# Patient Record
Sex: FEMALE | Race: WHITE
[De-identification: names, ages, dates, MRNs, and addresses within clinical notes are randomized per-mention and may not be internally consistent; named-entity substitution may affect disease eponyms.]

---

## 2017-04-11 ENCOUNTER — HOSPITAL ENCOUNTER (OUTPATIENT)
Dept: HOSPITAL 53 - M LRY | Age: 82
End: 2017-04-11
Attending: PHYSICIAN ASSISTANT
Payer: MEDICARE

## 2017-04-11 ENCOUNTER — HOSPITAL ENCOUNTER (OUTPATIENT)
Dept: HOSPITAL 53 - M SFHCLERA | Age: 82
End: 2017-04-11
Attending: PHYSICIAN ASSISTANT
Payer: MEDICARE

## 2017-04-11 DIAGNOSIS — Z79.899: ICD-10-CM

## 2017-04-11 DIAGNOSIS — Z79.1: ICD-10-CM

## 2017-04-11 DIAGNOSIS — M17.12: Primary | ICD-10-CM

## 2017-04-11 DIAGNOSIS — R55: Primary | ICD-10-CM

## 2017-04-11 DIAGNOSIS — R55: ICD-10-CM

## 2017-04-11 DIAGNOSIS — M17.12: ICD-10-CM

## 2017-04-11 LAB
ALBUMIN SERPL BCG-MCNC: 3.6 GM/DL (ref 3.2–5.2)
ALBUMIN/GLOB SERPL: 1.06 {RATIO} (ref 1–1.93)
ALP SERPL-CCNC: 102 U/L (ref 45–117)
ALT SERPL W P-5'-P-CCNC: 16 U/L (ref 12–78)
ANION GAP SERPL CALC-SCNC: 4 MEQ/L (ref 8–16)
AST SERPL-CCNC: 16 U/L (ref 15–37)
BILIRUB SERPL-MCNC: 0.3 MG/DL (ref 0.2–1)
BUN SERPL-MCNC: 18 MG/DL (ref 7–18)
CALCIUM SERPL-MCNC: 8.7 MG/DL (ref 8.8–10.2)
CHLORIDE SERPL-SCNC: 105 MEQ/L (ref 98–107)
CO2 SERPL-SCNC: 30 MEQ/L (ref 21–32)
CREAT SERPL-MCNC: 0.72 MG/DL (ref 0.55–1.02)
ERYTHROCYTE [DISTWIDTH] IN BLOOD BY AUTOMATED COUNT: 12 % (ref 11.5–14.5)
GFR SERPL CREATININE-BSD FRML MDRD: > 60 ML/MIN/{1.73_M2} (ref 32–?)
GLUCOSE SERPL-MCNC: 91 MG/DL (ref 83–110)
MCH RBC QN AUTO: 31.3 PG (ref 27–33)
MCHC RBC AUTO-ENTMCNC: 33.2 G/DL (ref 32–36.5)
MCV RBC AUTO: 94.3 FL (ref 80–96)
PLATELET # BLD AUTO: 237 K/MM3 (ref 150–450)
POTASSIUM SERPL-SCNC: 5.3 MEQ/L (ref 3.5–5.1)
PROT SERPL-MCNC: 7 GM/DL (ref 6.4–8.2)
SODIUM SERPL-SCNC: 139 MEQ/L (ref 136–145)
WBC # BLD AUTO: 4.8 K/MM3 (ref 4–10)

## 2017-04-11 NOTE — REP
Left knee five views:

 

There are no comparisons.

 

The patella is subluxed laterally.  I suspect this is chronic.

 

There is an old healed fracture through the metaphysis of the tibia.  There is

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  I suspect there is an old

healed fracture of the proximal fibula.

 

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

tricompartment osteoarthritis.

 

There is no joint effusion.

 

 

Signed by

Quincy Jasso MD 04/11/2017 06:39 P

## 2017-08-07 ENCOUNTER — HOSPITAL ENCOUNTER (OUTPATIENT)
Dept: HOSPITAL 53 - M LAB | Age: 82
End: 2017-08-07
Attending: OPHTHALMOLOGY
Payer: MEDICARE

## 2017-08-07 DIAGNOSIS — J44.9: ICD-10-CM

## 2017-08-07 DIAGNOSIS — I10: Primary | ICD-10-CM

## 2017-08-07 LAB
ANION GAP SERPL CALC-SCNC: 7 MEQ/L (ref 8–16)
BUN SERPL-MCNC: 16 MG/DL (ref 7–18)
CALCIUM SERPL-MCNC: 8.7 MG/DL (ref 8.8–10.2)
CHLORIDE SERPL-SCNC: 105 MEQ/L (ref 98–107)
CO2 SERPL-SCNC: 30 MEQ/L (ref 21–32)
CREAT SERPL-MCNC: 0.75 MG/DL (ref 0.55–1.02)
GFR SERPL CREATININE-BSD FRML MDRD: > 60 ML/MIN/{1.73_M2} (ref 32–?)
GLUCOSE SERPL-MCNC: 103 MG/DL (ref 83–110)
POTASSIUM SERPL-SCNC: 4.4 MEQ/L (ref 3.5–5.1)
SODIUM SERPL-SCNC: 142 MEQ/L (ref 136–145)

## 2017-08-07 NOTE — REP
CHEST, TWO VIEWS:

 

HISTORY:  Smoking history.

 

COMPARISON: 08/02/2012

 

The lungs are hyperinflated. An increase in interstitial markings is present in

the lungs. Bilateral apical pleural thickening is present. The heart is normal in

size. Calcified lymph nodes are present in the mediastinum. The pulmonary

vasculature is normal in appearance. The bony structure is osteopenic.

 

IMPRESSION:

 

1.  COPD.

 

2.  Old granulomatous disease.

 

 

Signed by

Domenico Arellano MD 08/07/2017 03:16 P

## 2017-08-09 ENCOUNTER — HOSPITAL ENCOUNTER (OUTPATIENT)
Dept: HOSPITAL 53 - M SFHCLERA | Age: 82
End: 2017-08-09
Attending: PHYSICIAN ASSISTANT
Payer: MEDICARE

## 2017-08-09 DIAGNOSIS — N39.0: Primary | ICD-10-CM

## 2017-08-09 NOTE — ECGEPIP
Stationary ECG Study

                              [facility]

                                       

                                       Test Date:    2017

Pat Name:     CAROLINA COBIAN            Department:   

Patient ID:   [id number]                 Room:         -

Gender:       F                        Technician:   

:          [id number]               Requested By: ALLEN JOSEPH

Order Number: [id number]     Reading MD:   Pedro Elliott

                                 Measurements

Intervals                              Axis          

Rate:         70                       P:            98

KS:           155                      QRS:          -15

QRSD:         80                       T:            50

QT:           358                                    

QTc:          388                                    

                           Interpretive Statements

SINUS RHYTHM

COMPARED TO THE LAST 2 TRACINGS IN THE SYSTEM, NO SIGNIFICANT CHANGES.

 ARTIFACT 

NOTED ON THE PRECORDIAL LEADS V4 AND V5

Electronically Signed On 2017 21:47:18 EDT by Pedro Elliott

## 2017-09-14 ENCOUNTER — HOSPITAL ENCOUNTER (OUTPATIENT)
Dept: HOSPITAL 53 - M SDC | Age: 82
Discharge: HOME | End: 2017-09-14
Attending: OPHTHALMOLOGY
Payer: MEDICARE

## 2017-09-14 VITALS — DIASTOLIC BLOOD PRESSURE: 76 MMHG | SYSTOLIC BLOOD PRESSURE: 142 MMHG

## 2017-09-14 VITALS — BODY MASS INDEX: 21.26 KG/M2 | WEIGHT: 120 LBS | HEIGHT: 63 IN

## 2017-09-14 DIAGNOSIS — I10: ICD-10-CM

## 2017-09-14 DIAGNOSIS — H25.11: Primary | ICD-10-CM

## 2017-09-14 DIAGNOSIS — M19.90: ICD-10-CM

## 2017-09-14 DIAGNOSIS — K59.09: ICD-10-CM

## 2017-09-14 DIAGNOSIS — Z88.1: ICD-10-CM

## 2017-09-14 DIAGNOSIS — Z87.440: ICD-10-CM

## 2017-09-14 DIAGNOSIS — Z87.891: ICD-10-CM

## 2017-09-14 DIAGNOSIS — H54.42: ICD-10-CM

## 2017-09-14 DIAGNOSIS — Z78.0: ICD-10-CM

## 2017-09-14 DIAGNOSIS — Z88.2: ICD-10-CM

## 2017-09-14 PROCEDURE — 66984 XCAPSL CTRC RMVL W/O ECP: CPT

## 2017-09-14 NOTE — RO
DATE OF PROCEDURE:  09/14/2017

 

PREOPERATIVE DIAGNOSIS:

Visually significant nuclear sclerotic cataract right eye.

 

POSTOPERATIVE DIAGNOSIS:

Visually significant nuclear sclerotic cataract right eye.

 

PROCEDURE:

Cataract extraction with use of phacoemulsification, and placement of 
intraocular

lens, AU00T0, 24.0 diopters, right eye.

 

SURGEON:  Norm Sutton DO

 

ASSISTANT:

 

ANESTHESIA:  Local with monitored anesthesia care (MAC).

 

COMPLICATIONS:  None.

 

POSTOPERATIVE CONDITION:  Stable.

 

INDICATION FOR SURGERY:

Blurred vision right eye affecting patient's activities of daily living.

 

DESCRIPTION OF PROCEDURE:  The patient was seen in the preoperative area and

properly identified.  The correct operative eye was identified and marked.

Attention was turned to that eye.  The patient received topical antibiotics in

the preoperative area.  The patient then received topical dilating drops

consisting of Tropicamide and Phenylephrine.

 

The patient was then transferred to the operating room.  The correct side was

re-identified.  The patient received topical anesthetics and antibiotics on the

surface of the eye.  The eye was prepped and draped in a sterile fashion.  The

upper and lower eyelids were isolated with Tegaderm tape, and the lids were held

open with an adjustable speculum.

 

Using a sideport blade, a paracentesis incision was made.  Intraocular

preservative-free lidocaine was then injected into the anterior chamber.

Viscoelastic was then injected into the anterior chamber through the

paracentesis.  Using a 2.4 mm sharp-tipped keratome, the anterior chamber was

entered via a temporal clear corneal incision.

 

A continuous curvilinear capsulorrhexis was created with the aid of a 26g

cystotome and utrata forceps.  Hydrodissection was performed with balanced salt

solution (BSS) on a blunt cannula until the nucleus was freely mobile.  The

crystalline lens was phacoemulsified and aspirated.  Additional cohesive

viscoelastic was placed into the capsular bag to deepen it.  An AU00T0, 24.0

diopters lens was placed into the capsular bag and confirmed by visualizing the

continuous curvilinear capsulorrhexis.  Additional irrigation and aspiration was

used to remove cortical material and remaining viscoelastic.

 

The clear corneal incision was hydrated with BSS on a blunt cannula.  The lens

was well positioned.  The incisions were then tested for leaks and found to be

negative.  The eye was then palpated for appropriate pressure and adjusted

accordingly with BSS.

 

After BSS was used to hydrate the clear corneal incision, ReSure sealant was

placed over it to ensure a watertight incision.

 

The eyelid speculum was carefully removed.  A shield was placed.

 

The patient tolerated the procedure well and was discharged to the recovery unit

in a stable condition.

CATARINA

## 2018-05-29 ENCOUNTER — HOSPITAL ENCOUNTER (OUTPATIENT)
Dept: HOSPITAL 53 - M LRY | Age: 83
End: 2018-05-29
Attending: FAMILY MEDICINE
Payer: MEDICARE

## 2018-05-29 ENCOUNTER — HOSPITAL ENCOUNTER (OUTPATIENT)
Dept: HOSPITAL 53 - M SFHCLERA | Age: 83
End: 2018-05-29
Attending: FAMILY MEDICINE
Payer: MEDICARE

## 2018-05-29 DIAGNOSIS — R33.9: Primary | ICD-10-CM

## 2018-05-29 LAB
ANION GAP: 7 MEQ/L (ref 8–16)
BLOOD UREA NITROGEN: 14 MG/DL (ref 7–18)
CALCIUM LEVEL: 8.4 MG/DL (ref 8.8–10.2)
CARBON DIOXIDE LEVEL: 28 MEQ/L (ref 21–32)
CHLORIDE LEVEL: 107 MEQ/L (ref 98–107)
CREATININE FOR GFR: 0.68 MG/DL (ref 0.55–1.3)
GFR SERPL CREATININE-BSD FRML MDRD: > 60 ML/MIN/{1.73_M2} (ref 32–?)
GLUCOSE, FASTING: 84 MG/DL (ref 70–100)
POTASSIUM SERUM: 4.4 MEQ/L (ref 3.5–5.1)
SODIUM LEVEL: 142 MEQ/L (ref 136–145)

## 2018-05-29 PROCEDURE — 80048 BASIC METABOLIC PNL TOTAL CA: CPT

## 2018-05-29 PROCEDURE — 76857 US EXAM PELVIC LIMITED: CPT

## 2018-06-07 ENCOUNTER — HOSPITAL ENCOUNTER (INPATIENT)
Dept: HOSPITAL 53 - M ED | Age: 83
LOS: 5 days | Discharge: HOME HEALTH | DRG: 554 | End: 2018-06-12
Attending: INTERNAL MEDICINE | Admitting: INTERNAL MEDICINE
Payer: MEDICARE

## 2018-06-07 DIAGNOSIS — M16.0: Primary | ICD-10-CM

## 2018-06-07 DIAGNOSIS — E86.0: ICD-10-CM

## 2018-06-07 DIAGNOSIS — Z88.2: ICD-10-CM

## 2018-06-07 DIAGNOSIS — F03.90: ICD-10-CM

## 2018-06-07 DIAGNOSIS — R13.10: ICD-10-CM

## 2018-06-07 DIAGNOSIS — N81.10: ICD-10-CM

## 2018-06-07 DIAGNOSIS — K59.00: ICD-10-CM

## 2018-06-07 DIAGNOSIS — R82.71: ICD-10-CM

## 2018-06-07 DIAGNOSIS — I48.91: ICD-10-CM

## 2018-06-07 DIAGNOSIS — M79.652: ICD-10-CM

## 2018-06-07 DIAGNOSIS — M79.651: ICD-10-CM

## 2018-06-07 DIAGNOSIS — R32: ICD-10-CM

## 2018-06-07 LAB
AMORPHOUS SEDIMENT RFX: (no result)
ANION GAP: 8 MEQ/L (ref 8–16)
BASO #: 0 10^3/UL (ref 0–0.2)
BASO %: 0.1 % (ref 0–1)
BLOOD UREA NITROGEN: 20 MG/DL (ref 7–18)
CALCIUM LEVEL: 8.5 MG/DL (ref 8.8–10.2)
CARBON DIOXIDE LEVEL: 26 MEQ/L (ref 21–32)
CHLORIDE LEVEL: 103 MEQ/L (ref 98–107)
CREATININE FOR GFR: 0.86 MG/DL (ref 0.55–1.3)
EOS #: 0 10^3/UL (ref 0–0.5)
EOSINOPHIL NFR BLD AUTO: 0.1 % (ref 0–3)
GFR SERPL CREATININE-BSD FRML MDRD: > 60 ML/MIN/{1.73_M2} (ref 32–?)
GLUCOSE, FASTING: 95 MG/DL (ref 70–100)
HEMATOCRIT: 39.8 % (ref 36–47)
HEMOGLOBIN: 13.4 G/DL (ref 12–15.5)
IMMATURE GRANULOCYTE %: 0.9 % (ref 0–3)
INR: 1.05
LEUKOCYTE ESTERASE UR AUTO RFX: (no result)
LYMPH #: 1.1 10^3/UL (ref 1.5–4.5)
LYMPH %: 7.7 % (ref 24–44)
MEAN CORPUSCULAR HEMOGLOBIN: 31.4 PG (ref 27–33)
MEAN CORPUSCULAR HGB CONC: 33.7 G/DL (ref 32–36.5)
MEAN CORPUSCULAR VOLUME: 93.2 FL (ref 80–96)
MONO #: 0.9 10^3/UL (ref 0–0.8)
MONO %: 6 % (ref 0–5)
NEUTROPHILS #: 12.3 10^3/UL (ref 1.8–7.7)
NEUTROPHILS %: 85.2 % (ref 36–66)
NRBC BLD AUTO-RTO: 0 % (ref 0–0)
PARTIAL THROMBOPLASTIN TIME: 29.8 SECONDS (ref 26.8–37.9)
PLATELET COUNT, AUTOMATED: 138 10^3/UL (ref 150–450)
POTASSIUM SERUM: 4.1 MEQ/L (ref 3.5–5.1)
PROTHROMBIN TIME: 13.8 SECONDS (ref 12.4–14.5)
RED BLOOD COUNT: 4.27 10^6/UL (ref 4–5.4)
RED CELL DISTRIBUTION WIDTH: 12.1 % (ref 11.5–14.5)
SODIUM LEVEL: 137 MEQ/L (ref 136–145)
SPECIFIC GRAVITY UR AUTO RFX: 1.01 (ref 1–1.03)
SQUAM EPITHELIAL CELL UR AURFX: 8 /HPF (ref 0–6)
WHITE BLOOD COUNT: 14.4 10^3/UL (ref 4–10)

## 2018-06-07 RX ADMIN — MORPHINE SULFATE 1 MG: 2 INJECTION, SOLUTION INTRAMUSCULAR; INTRAVENOUS at 13:57

## 2018-06-07 RX ADMIN — SODIUM CHLORIDE 1 MLS/HR: 9 INJECTION, SOLUTION INTRAVENOUS at 12:30

## 2018-06-07 RX ADMIN — IBUPROFEN 1 MG: 600 TABLET, FILM COATED ORAL at 19:54

## 2018-06-07 RX ADMIN — SODIUM CHLORIDE 1 MLS/HR: 9 INJECTION, SOLUTION INTRAVENOUS at 21:38

## 2018-06-07 RX ADMIN — CEFTRIAXONE 1 MLS/HR: 1 INJECTION, POWDER, FOR SOLUTION INTRAMUSCULAR; INTRAVENOUS at 15:02

## 2018-06-07 RX ADMIN — ACETAMINOPHEN AND CODEINE PHOSPHATE 1 EA: 300; 30 TABLET ORAL at 17:42

## 2018-06-08 LAB
ADD MANUAL DIFF: NO
ANION GAP: 8 MEQ/L (ref 8–16)
BLOOD UREA NITROGEN: 21 MG/DL (ref 7–18)
CALCIUM LEVEL: 7.6 MG/DL (ref 8.8–10.2)
CARBON DIOXIDE LEVEL: 22 MEQ/L (ref 21–32)
CHLORIDE LEVEL: 108 MEQ/L (ref 98–107)
CREATININE FOR GFR: 0.66 MG/DL (ref 0.55–1.3)
DIFF SLIDE NUMBER: 80
GFR SERPL CREATININE-BSD FRML MDRD: > 60 ML/MIN/{1.73_M2} (ref 32–?)
GLUCOSE, FASTING: 84 MG/DL (ref 70–100)
HEMATOCRIT: 33.4 % (ref 36–47)
HEMOGLOBIN: 11.4 G/DL (ref 12–15.5)
IMMATURE PLATELET FRACTION %: 6.3 % (ref 0–9.6)
LYMPHOCYTES: 3 % (ref 16–52)
MEAN CORPUSCULAR HEMOGLOBIN: 31.6 PG (ref 27–33)
MEAN CORPUSCULAR HGB CONC: 34.1 G/DL (ref 32–36.5)
MEAN CORPUSCULAR VOLUME: 92.5 FL (ref 80–96)
NEUTROPHILS: 97 % (ref 35–75)
NRBC BLD AUTO-RTO: 0 % (ref 0–0)
PLATELET BLD QL SMEAR: (no result)
PLATELET COUNT, AUTOMATED: 95 10^3/UL (ref 150–450)
PLATELET F: 95
POTASSIUM SERUM: 3.6 MEQ/L (ref 3.5–5.1)
RBC MORPHOLOGY: NORMAL
RED BLOOD COUNT: 3.61 10^6/UL (ref 4–5.4)
RED CELL DISTRIBUTION WIDTH: 12.3 % (ref 11.5–14.5)
SODIUM LEVEL: 138 MEQ/L (ref 136–145)
WHITE BLOOD COUNT: 10.4 10^3/UL (ref 4–10)

## 2018-06-08 RX ADMIN — ACETAMINOPHEN AND CODEINE PHOSPHATE 1 EA: 300; 30 TABLET ORAL at 01:52

## 2018-06-08 RX ADMIN — MORPHINE SULFATE 1 MG: 4 INJECTION INTRAVENOUS at 03:43

## 2018-06-08 RX ADMIN — SODIUM CHLORIDE 1 MLS/HR: 9 INJECTION, SOLUTION INTRAVENOUS at 03:42

## 2018-06-08 RX ADMIN — SENNOSIDES 1 TAB: 8.6 TABLET, FILM COATED ORAL at 21:03

## 2018-06-08 RX ADMIN — SODIUM CHLORIDE 1 MLS/HR: 9 INJECTION, SOLUTION INTRAVENOUS at 01:38

## 2018-06-08 RX ADMIN — SENNOSIDES 1 TAB: 8.6 TABLET, FILM COATED ORAL at 12:46

## 2018-06-08 RX ADMIN — DOCUSATE SODIUM 1 MG: 100 CAPSULE, LIQUID FILLED ORAL at 12:45

## 2018-06-08 RX ADMIN — IBUPROFEN 1 MG: 600 TABLET, FILM COATED ORAL at 02:44

## 2018-06-09 RX ADMIN — SODIUM PHOSPHATE, DIBASIC AND SODIUM PHOSPHATE, MONOBASIC 1 EA: 7; 19 ENEMA RECTAL at 15:40

## 2018-06-09 RX ADMIN — BISACODYL 1 MG: 5 TABLET, COATED ORAL at 15:39

## 2018-06-09 RX ADMIN — IBUPROFEN 1 MG: 600 TABLET, FILM COATED ORAL at 15:57

## 2018-06-09 RX ADMIN — DOCUSATE SODIUM,SENNOSIDES 1 TAB: 50; 8.6 TABLET, FILM COATED ORAL at 21:26

## 2018-06-09 RX ADMIN — SENNOSIDES 1 TAB: 8.6 TABLET, FILM COATED ORAL at 10:16

## 2018-06-09 RX ADMIN — DOCUSATE SODIUM 1 MG: 100 CAPSULE, LIQUID FILLED ORAL at 10:17

## 2018-06-10 RX ADMIN — DOCUSATE SODIUM,SENNOSIDES 1 TAB: 50; 8.6 TABLET, FILM COATED ORAL at 10:11

## 2018-06-10 RX ADMIN — BISACODYL 1 MG: 5 TABLET, COATED ORAL at 10:11

## 2018-06-10 RX ADMIN — LACTULOSE 1 ML: 10 SOLUTION ORAL at 06:10

## 2018-06-10 RX ADMIN — LACTULOSE 1 ML: 10 SOLUTION ORAL at 10:10

## 2018-06-10 RX ADMIN — DOCUSATE SODIUM,SENNOSIDES 1 TAB: 50; 8.6 TABLET, FILM COATED ORAL at 20:39

## 2018-06-11 RX ADMIN — BISACODYL 1 MG: 5 TABLET, COATED ORAL at 09:01

## 2018-06-11 RX ADMIN — DOCUSATE SODIUM,SENNOSIDES 1 TAB: 50; 8.6 TABLET, FILM COATED ORAL at 09:02

## 2018-06-11 RX ADMIN — DOCUSATE SODIUM,SENNOSIDES 1 TAB: 50; 8.6 TABLET, FILM COATED ORAL at 21:10

## 2018-06-11 RX ADMIN — IBUPROFEN 1 MG: 600 TABLET, FILM COATED ORAL at 00:10

## 2018-06-12 RX ADMIN — DOCUSATE SODIUM,SENNOSIDES 1 TAB: 50; 8.6 TABLET, FILM COATED ORAL at 09:44

## 2018-06-12 RX ADMIN — BISACODYL 1 MG: 5 TABLET, COATED ORAL at 09:44

## 2018-10-02 ENCOUNTER — HOSPITAL ENCOUNTER (OUTPATIENT)
Dept: HOSPITAL 53 - M LAB REF | Age: 83
End: 2018-10-02
Attending: DERMATOLOGY
Payer: MEDICARE

## 2018-10-02 DIAGNOSIS — C44.519: Primary | ICD-10-CM

## 2018-10-02 PROCEDURE — 88305 TISSUE EXAM BY PATHOLOGIST: CPT

## 2018-10-30 ENCOUNTER — HOSPITAL ENCOUNTER (OUTPATIENT)
Dept: HOSPITAL 53 - M LAB REF | Age: 83
End: 2018-10-30
Attending: DERMATOLOGY
Payer: MEDICARE

## 2018-10-30 DIAGNOSIS — C44.519: Primary | ICD-10-CM

## 2018-10-30 DIAGNOSIS — C44.509: Primary | ICD-10-CM

## 2018-10-30 PROCEDURE — 88305 TISSUE EXAM BY PATHOLOGIST: CPT

## 2018-11-07 ENCOUNTER — HOSPITAL ENCOUNTER (OUTPATIENT)
Dept: HOSPITAL 53 - M SFHCSACK | Age: 83
End: 2018-11-07
Attending: PHYSICIAN ASSISTANT
Payer: MEDICARE

## 2018-11-07 DIAGNOSIS — Z86.79: ICD-10-CM

## 2018-11-07 DIAGNOSIS — Z13.220: ICD-10-CM

## 2018-11-07 DIAGNOSIS — M25.562: ICD-10-CM

## 2018-11-07 DIAGNOSIS — E11.9: ICD-10-CM

## 2018-11-07 DIAGNOSIS — M25.561: ICD-10-CM

## 2018-11-07 DIAGNOSIS — F34.1: ICD-10-CM

## 2018-11-07 DIAGNOSIS — G89.29: ICD-10-CM

## 2018-11-07 DIAGNOSIS — K59.00: ICD-10-CM

## 2018-11-07 DIAGNOSIS — Z13.21: ICD-10-CM

## 2018-11-07 DIAGNOSIS — I10: Primary | ICD-10-CM

## 2018-11-07 LAB
25(OH)D3 SERPL-MCNC: 10.9 NG/ML (ref 30–100)
ALBUMIN/GLOBULIN RATIO: 1.17 (ref 1–1.93)
ALBUMIN: 4.1 GM/DL (ref 3.2–5.2)
ALKALINE PHOSPHATASE: 80 U/L (ref 45–117)
ALT SERPL W P-5'-P-CCNC: 16 U/L (ref 12–78)
ANION GAP: 9 MEQ/L (ref 8–16)
AST SERPL-CCNC: 19 U/L (ref 7–37)
BASO #: 0.1 10^3/UL (ref 0–0.2)
BASO %: 1.2 % (ref 0–1)
BILIRUBIN,TOTAL: 0.5 MG/DL (ref 0.2–1)
BLOOD UREA NITROGEN: 15 MG/DL (ref 7–18)
CALCIUM LEVEL: 9.1 MG/DL (ref 8.8–10.2)
CARBON DIOXIDE LEVEL: 28 MEQ/L (ref 21–32)
CHLORIDE LEVEL: 106 MEQ/L (ref 98–107)
CHOLEST SERPL-MCNC: 176 MG/DL (ref ?–200)
CHOLESTEROL RISK RATIO: 2.07 (ref ?–5)
CREATININE FOR GFR: 0.75 MG/DL (ref 0.55–1.3)
EOS #: 0.1 10^3/UL (ref 0–0.5)
EOSINOPHIL NFR BLD AUTO: 1.9 % (ref 0–3)
FREE T4: 1.24 NG/DL (ref 0.76–1.46)
GFR SERPL CREATININE-BSD FRML MDRD: > 60 ML/MIN/{1.73_M2} (ref 32–?)
GLUCOSE, FASTING: 67 MG/DL (ref 70–100)
HDLC SERPL-MCNC: 85 MG/DL (ref 40–?)
HEMATOCRIT: 39.2 % (ref 36–47)
HEMOGLOBIN: 13.2 G/DL (ref 12–15.5)
IMMATURE GRANULOCYTE %: 0.2 % (ref 0–3)
LDL CHOLESTEROL: 76 MG/DL (ref ?–100)
LYMPH #: 1.6 10^3/UL (ref 1.5–4.5)
LYMPH %: 29.8 % (ref 24–44)
MEAN CORPUSCULAR HEMOGLOBIN: 32.1 PG (ref 27–33)
MEAN CORPUSCULAR HGB CONC: 33.7 G/DL (ref 32–36.5)
MEAN CORPUSCULAR VOLUME: 95.4 FL (ref 80–96)
MONO #: 0.5 10^3/UL (ref 0–0.8)
MONO %: 10.4 % (ref 0–5)
NEUTROPHILS #: 2.9 10^3/UL (ref 1.8–7.7)
NEUTROPHILS %: 56.5 % (ref 36–66)
NONHDLC SERPL-MCNC: 91 MG/DL
NRBC BLD AUTO-RTO: 0 % (ref 0–0)
PLATELET COUNT, AUTOMATED: 171 10^3/UL (ref 150–450)
POTASSIUM SERUM: 3.8 MEQ/L (ref 3.5–5.1)
RED BLOOD COUNT: 4.11 10^6/UL (ref 4–5.4)
RED CELL DISTRIBUTION WIDTH: 12.1 % (ref 11.5–14.5)
SODIUM LEVEL: 143 MEQ/L (ref 136–145)
THYROID STIMULATING HORMONE: 2.68 UIU/ML (ref 0.36–3.74)
TOTAL PROTEIN: 7.6 GM/DL (ref 6.4–8.2)
TRIGLYCERIDES LEVEL: 74 MG/DL (ref ?–150)
WHITE BLOOD COUNT: 5.2 10^3/UL (ref 4–10)

## 2018-11-07 PROCEDURE — 84443 ASSAY THYROID STIM HORMONE: CPT

## 2019-02-25 ENCOUNTER — HOSPITAL ENCOUNTER (OUTPATIENT)
Dept: HOSPITAL 53 - M LAB REF | Age: 84
End: 2019-02-25
Attending: PHYSICIAN ASSISTANT
Payer: MEDICARE

## 2019-02-25 DIAGNOSIS — N39.0: Primary | ICD-10-CM

## 2019-02-25 LAB
APPEARANCE UR: CLEAR
BACTERIA UR QL AUTO: (no result)
BILIRUB UR QL STRIP.AUTO: NEGATIVE
GLUCOSE UR QL STRIP.AUTO: NEGATIVE MG/DL
HGB UR QL STRIP.AUTO: NEGATIVE
KETONES UR QL STRIP.AUTO: (no result) MG/DL
LEUKOCYTE ESTERASE UR QL STRIP.AUTO: (no result)
NITRITE UR QL STRIP.AUTO: POSITIVE
PH UR STRIP.AUTO: 6 UNITS (ref 5–9)
PROT UR QL STRIP.AUTO: NEGATIVE MG/DL
RBC # UR AUTO: 4 /HPF (ref 0–3)
SP GR UR STRIP.AUTO: 1.02 (ref 1–1.03)
SQUAMOUS #/AREA URNS AUTO: 3 /HPF (ref 0–6)
UROBILINOGEN UR QL STRIP.AUTO: 2 MG/DL (ref 0–2)
WBC #/AREA URNS AUTO: 30 /HPF (ref 0–3)

## 2019-03-22 ENCOUNTER — HOSPITAL ENCOUNTER (OUTPATIENT)
Dept: HOSPITAL 53 - M SFHCSACK | Age: 84
End: 2019-03-22
Attending: PHYSICIAN ASSISTANT
Payer: MEDICARE

## 2019-03-22 DIAGNOSIS — R30.0: Primary | ICD-10-CM
